# Patient Record
Sex: OTHER/UNKNOWN | URBAN - METROPOLITAN AREA
[De-identification: names, ages, dates, MRNs, and addresses within clinical notes are randomized per-mention and may not be internally consistent; named-entity substitution may affect disease eponyms.]

---

## 2019-02-02 ENCOUNTER — TELEPHONE (OUTPATIENT)
Dept: OTHER | Facility: CLINIC | Age: 39
End: 2019-02-02

## 2019-02-02 NOTE — TELEPHONE ENCOUNTER
2/2/2019    Call Regarding Onboarding: P1 - other    Attempt 1    Message on voicemail     Comments: spouse dep      Outreach   CHAPINCITO